# Patient Record
Sex: MALE | Race: WHITE | NOT HISPANIC OR LATINO | Employment: OTHER | URBAN - NONMETROPOLITAN AREA
[De-identification: names, ages, dates, MRNs, and addresses within clinical notes are randomized per-mention and may not be internally consistent; named-entity substitution may affect disease eponyms.]

---

## 2017-05-17 ENCOUNTER — OFFICE VISIT (OUTPATIENT)
Dept: CARDIOLOGY | Facility: CLINIC | Age: 67
End: 2017-05-17

## 2017-05-17 VITALS
HEART RATE: 64 BPM | SYSTOLIC BLOOD PRESSURE: 142 MMHG | DIASTOLIC BLOOD PRESSURE: 76 MMHG | WEIGHT: 246 LBS | HEIGHT: 70 IN | BODY MASS INDEX: 35.22 KG/M2 | OXYGEN SATURATION: 97 %

## 2017-05-17 DIAGNOSIS — R07.9 CHEST PAIN, UNSPECIFIED TYPE: ICD-10-CM

## 2017-05-17 DIAGNOSIS — R06.02 SHORTNESS OF BREATH: Primary | ICD-10-CM

## 2017-05-17 DIAGNOSIS — I10 ESSENTIAL HYPERTENSION: ICD-10-CM

## 2017-05-17 PROCEDURE — 99214 OFFICE O/P EST MOD 30 MIN: CPT | Performed by: PHYSICIAN ASSISTANT

## 2017-07-05 ENCOUNTER — HOSPITAL ENCOUNTER (OUTPATIENT)
Dept: CARDIOLOGY | Facility: HOSPITAL | Age: 67
Discharge: HOME | End: 2017-07-05

## 2017-07-05 ENCOUNTER — OUTSIDE FACILITY SERVICE (OUTPATIENT)
Dept: CARDIOLOGY | Facility: CLINIC | Age: 67
End: 2017-07-05

## 2017-07-05 LAB
MAXIMAL PREDICTED HEART RATE: 151 BPM
STRESS TARGET HR: 128 BPM

## 2017-07-05 PROCEDURE — A9500 TC99M SESTAMIBI: HCPCS | Performed by: INTERNAL MEDICINE

## 2017-07-05 PROCEDURE — 78452 HT MUSCLE IMAGE SPECT MULT: CPT | Performed by: INTERNAL MEDICINE

## 2017-07-05 PROCEDURE — 93306 TTE W/DOPPLER COMPLETE: CPT | Performed by: INTERNAL MEDICINE

## 2017-07-05 PROCEDURE — 78452 HT MUSCLE IMAGE SPECT MULT: CPT

## 2017-07-05 PROCEDURE — 25010000002 REGADENOSON 0.4 MG/5ML SOLUTION: Performed by: INTERNAL MEDICINE

## 2017-07-05 PROCEDURE — 93018 CV STRESS TEST I&R ONLY: CPT | Performed by: INTERNAL MEDICINE

## 2017-07-05 PROCEDURE — 93017 CV STRESS TEST TRACING ONLY: CPT

## 2017-07-05 PROCEDURE — 0 TECHNETIUM SESTAMIBI: Performed by: INTERNAL MEDICINE

## 2017-07-05 PROCEDURE — 93306 TTE W/DOPPLER COMPLETE: CPT

## 2017-07-05 RX ADMIN — Medication 1 DOSE: at 11:15

## 2017-07-05 RX ADMIN — REGADENOSON 0.4 MG: 0.08 INJECTION, SOLUTION INTRAVENOUS at 11:15

## 2017-07-06 ENCOUNTER — DOCUMENTATION (OUTPATIENT)
Dept: CARDIOLOGY | Facility: CLINIC | Age: 67
End: 2017-07-06

## 2017-07-10 ENCOUNTER — DOCUMENTATION (OUTPATIENT)
Dept: CARDIOLOGY | Facility: CLINIC | Age: 67
End: 2017-07-10

## 2017-07-10 NOTE — PROGRESS NOTES
Echo results back in the office, 3-6 mo. F/u recommended. Patient already has a f/u appt. Scheduled for 10-10-17. PH,LPN

## 2017-10-12 ENCOUNTER — OFFICE VISIT (OUTPATIENT)
Dept: CARDIOLOGY | Facility: CLINIC | Age: 67
End: 2017-10-12

## 2017-10-12 DIAGNOSIS — I10 ESSENTIAL HYPERTENSION: Primary | ICD-10-CM

## 2017-10-12 DIAGNOSIS — R06.02 SHORTNESS OF BREATH: ICD-10-CM

## 2017-10-12 DIAGNOSIS — R07.9 CHEST PAIN, UNSPECIFIED TYPE: ICD-10-CM

## 2017-10-12 PROCEDURE — 99213 OFFICE O/P EST LOW 20 MIN: CPT | Performed by: PHYSICIAN ASSISTANT

## 2017-10-12 PROCEDURE — 93000 ELECTROCARDIOGRAM COMPLETE: CPT | Performed by: PHYSICIAN ASSISTANT

## 2017-10-12 NOTE — PROGRESS NOTES
Problem list     Subjective   Refugio Reyes is a 67 y.o. male     Chief Complaint   Patient presents with   • Hypertension     presents as a follow up       HPI    Problem list  1. Nonsustained ventricular tachycardia noted by event monitor in September 2016  1.1 low-risk stress test September 2016 And July 2017  1.2 preserved systolic function by echocardiogram  1.3 patient has been asymptomatic in regards to dysrhythmic symptoms. He is currently on beta blocker  2. Hypertension  3. Diabetes mellitus   4.Dyslipidemia  Patient is a 67-year-old male that presents back for follow-up.  Patient has been doing well.  Patient continues to have chest discomfort although it appears atypical.  He will get sharp discomfort in the left anterior chest that will resolve spontaneously.  He does not describe exertional chest discomfort.  He does not describe referral of pain associated nausea or diaphoresis.  His shortness of breath is mild with exertion and nothing that has been progressive.  He denies PND orthopnea.  He doesn't palpitate or dysrhythmic symptoms and otherwise feels well      Outpatient Encounter Prescriptions as of 10/12/2017   Medication Sig Dispense Refill   • aspirin 81 MG EC tablet Take 81 mg by mouth daily.     • atenolol (TENORMIN) 25 MG tablet Take 1 tablet by mouth daily. 30 tablet 6   • buPROPion SR (WELLBUTRIN SR) 150 MG 12 hr tablet Take 300 mg by mouth daily.     • felodipine (PLENDIL) 10 MG 24 hr tablet Take 10 mg by mouth daily.     • finasteride (PROSCAR) 5 MG tablet Take 5 mg by mouth daily.     • glipiZIDE (GLUCOTROL) 10 MG tablet Take 20 mg by mouth 2 (two) times a day.     • lisinopril (PRINIVIL,ZESTRIL) 40 MG tablet Take 40 mg by mouth daily.     • metFORMIN (GLUCOPHAGE) 1000 MG tablet Take 1,000 mg by mouth.     • nitroglycerin (NITROSTAT) 0.4 MG SL tablet 1 under the tongue as needed for angina, may repeat q5mins for up three doses 100 tablet 11   • omeprazole (PriLOSEC) 40 MG capsule Take  40 mg by mouth 2 (two) times a day as needed.     • simvastatin (ZOCOR) 20 MG tablet Take 10 mg by mouth every night.     • sulindac (CLINORIL) 200 MG tablet Take 200 mg by mouth 2 (two) times a day.     • traMADol (ULTRAM) 50 MG tablet Take 100 mg by mouth 2 (two) times a day.       No facility-administered encounter medications on file as of 10/12/2017.        Review of patient's allergies indicates no known allergies.    Past Medical History:   Diagnosis Date   • BPH (benign prostatic hypertrophy)    • Depression    • Diabetes mellitus    • Hyperlipidemia    • Hypertension    • PTSD (post-traumatic stress disorder)        Social History     Social History   • Marital status:      Spouse name: N/A   • Number of children: N/A   • Years of education: N/A     Occupational History   • Not on file.     Social History Main Topics   • Smoking status: Former Smoker   • Smokeless tobacco: Never Used   • Alcohol use No   • Drug use: No   • Sexual activity: Not on file     Other Topics Concern   • Not on file     Social History Narrative       Family History   Problem Relation Age of Onset   • Lung cancer Mother        Review of Systems   Constitutional: Negative.    HENT: Negative.    Eyes: Negative.    Respiratory: Positive for shortness of breath.    Cardiovascular: Negative.    Gastrointestinal: Negative.    Endocrine: Negative.    Genitourinary: Negative.    Musculoskeletal: Negative.    Skin: Negative.    Allergic/Immunologic: Negative.    Neurological: Negative.    Hematological: Negative.    Psychiatric/Behavioral: Negative.        Objective     There were no vitals taken for this visit.    Lab Results (most recent)     None          Physical Exam   Constitutional: He is oriented to person, place, and time. He appears well-developed and well-nourished. No distress.   HENT:   Head: Normocephalic and atraumatic.   Eyes: EOM are normal. Pupils are equal, round, and reactive to light.   Neck: No JVD present.    Cardiovascular: Normal rate, regular rhythm and normal heart sounds.  Exam reveals no gallop and no friction rub.    No murmur heard.  Pulmonary/Chest: Effort normal and breath sounds normal. No respiratory distress. He has no wheezes. He has no rales. He exhibits no tenderness.   Abdominal: Soft.   Musculoskeletal: Normal range of motion. He exhibits no edema.   Neurological: He is alert and oriented to person, place, and time. No cranial nerve deficit.   Skin: Skin is warm and dry. No rash noted. No erythema. No pallor.   Psychiatric: He has a normal mood and affect. His behavior is normal.   Nursing note and vitals reviewed.      Procedure     ECG 12 Lead  Date/Time: 10/12/2017 8:41 AM  Performed by: VERENA JOAQUIN  Authorized by: VERENA JOAQUIN   Comments: Hypertension     EKG demonstrates sinus rhythm at 67 bpm, first-degree AV block, no acute ST changes               Assessment/Plan     Problems Addressed this Visit        Cardiovascular and Mediastinum    Essential hypertension - Primary    Relevant Orders    ECG 12 Lead       Respiratory    Shortness of breath       Nervous and Auditory    Chest pain              Recommendation  1.  I have had a lengthy discussion in regards to patient's symptoms.  Shortness of breath is mild and chest pain appears atypical by description.  He has not had to take nitroglycerin and stress test is normal.  However, we have discussed the 20-25% probability of a false negative stress test.  In a patient with significant risk factors and continued symptoms, catheterization is indicated for failure to control symptoms despite maximum guide line directed therapy.  However, he does not appear interested in any further evaluation.  I did extensively discussed with him that if chest pain worsens in any way, shortness of breath worsens, to contact our office immediately.  2.  In regards to risk factor management, he is artery on statin therapy and lipids are monitored by  primary.  Blood pressure has been well-controlled.  We recommend increasing exercise activity.  3.  We will see him back for follow-up in 6 months or sooner symptoms discussed.  Follow-up primary as scheduled